# Patient Record
Sex: MALE | URBAN - METROPOLITAN AREA
[De-identification: names, ages, dates, MRNs, and addresses within clinical notes are randomized per-mention and may not be internally consistent; named-entity substitution may affect disease eponyms.]

---

## 2023-02-15 ENCOUNTER — APPOINTMENT (OUTPATIENT)
Dept: URBAN - METROPOLITAN AREA CLINIC 232 | Age: 63
Setting detail: DERMATOLOGY
End: 2023-02-15

## 2023-02-15 DIAGNOSIS — B35.1 TINEA UNGUIUM: ICD-10-CM

## 2023-02-15 PROCEDURE — OTHER MEDICATION COUNSELING: OTHER

## 2023-02-15 PROCEDURE — OTHER PHOTO-DOCUMENTATION: OTHER

## 2023-02-15 PROCEDURE — OTHER ADDITIONAL NOTES: OTHER

## 2023-02-15 PROCEDURE — OTHER PRESCRIPTION: OTHER

## 2023-02-15 PROCEDURE — OTHER RECOMMENDATIONS: OTHER

## 2023-02-15 PROCEDURE — OTHER COUNSELING: OTHER

## 2023-02-15 PROCEDURE — 99203 OFFICE O/P NEW LOW 30 MIN: CPT

## 2023-02-15 PROCEDURE — OTHER TREATMENT REGIMEN: OTHER

## 2023-02-15 RX ORDER — FLUCONAZOLE 150 MG/1
TABLET ORAL
Qty: 42 | Refills: 0 | COMMUNITY
Start: 2023-02-15

## 2023-02-15 RX ORDER — KETOCONAZOLE 20 MG/G
CREAM TOPICAL
Qty: 60 | Refills: 3 | COMMUNITY
Start: 2023-02-15

## 2023-02-15 ASSESSMENT — LOCATION DETAILED DESCRIPTION DERM: LOCATION DETAILED: RIGHT INDEX FINGERNAIL

## 2023-02-15 ASSESSMENT — LOCATION SIMPLE DESCRIPTION DERM: LOCATION SIMPLE: RIGHT INDEX FINGER

## 2023-02-15 ASSESSMENT — LOCATION ZONE DERM: LOCATION ZONE: FINGERNAIL

## 2023-02-15 NOTE — PROCEDURE: MEDICATION COUNSELING
6 Siliq Counseling:  I discussed with the patient the risks of Siliq including but not limited to new or worsening depression, suicidal thoughts and behavior, immunosuppression, malignancy, posterior leukoencephalopathy syndrome, and serious infections.  The patient understands that monitoring is required including a PPD at baseline and must alert us or the primary physician if symptoms of infection or other concerning signs are noted. There is also a special program designed to monitor depression which is required with Siliq.

## 2023-02-15 NOTE — PROCEDURE: ADDITIONAL NOTES
Render Risk Assessment In Note?: no
Additional Notes: Suggest patient sees Podiatrist to thin out the affected nail.
Detail Level: Simple

## 2023-02-15 NOTE — PROCEDURE: RECOMMENDATIONS
Render Risk Assessment In Note?: no
Detail Level: Zone
Recommendations (Free Text): Recommend pt uses Vicks VaporRub with occlusion nightly.\\nPigment is not appreciated in the affected nail, so concern for melanoma is low.  Should the nail show signs of pigment, a nail biopsy can be done (recommend Dr. Ford).  He mentioned liver issues, however he does take fluconazole so I just adjusted the dosing schedule to \"pulse\" dosing.
Recommendation Preamble: The following recommendations were made during the visit:

## 2023-02-15 NOTE — PROCEDURE: TREATMENT REGIMEN
Detail Level: Zone
Initiate Treatment: fluconazole 150 mg tablet \\nTake one tablet by mouth once daily at the start of each month for the first 7 days for the next 6 months.\\n\\nketoconazole 2 % topical cream \\nApply to affected areas on the hand up to twice daily.

## 2023-12-28 NOTE — PROCEDURE: MEDICATION COUNSELING
Requested medication(s) are due for refill today: Yes  Patient has already received a courtesy refill: No  Other reason request has been forwarded to provider: This refill cannot be delegated   Sotyktu Pregnancy And Lactation Text: There is insufficient data to evaluate whether or not Sotyktu is safe to use during pregnancy.   It is not known if Sotyktu passes into breast milk and whether or not it is safe to use when breastfeeding.

## 2025-01-31 NOTE — PROCEDURE: MEDICATION COUNSELING
01/31/25 0900   Team Meeting   Meeting Type Daily Rounds   Team Members Present   Team Members Present Physician;Nurse;;Occupational Therapist;Other (Discipline and Name)   Physician Team Member Emigdio   Nursing Team Member Andria   Social Work Team Member Edmar   OT Team Member Kirill   Other (Discipline and Name) Mario Arreola   Patient/Family Present   Patient Present No   Patient's Family Present No   Pt is med/meal compliant and visible on the milieu. Pt participates in groups and engages with staff and peers. Pt reports scales of a 2 for depression and a 0 for anxiety. Pt denies all SI/SIB/AVH/HI at this time. Pt's projected discharge date is scheduled for 02/05/25.    Drysol Counseling:  I discussed with the patient the risks of drysol/aluminum chloride including but not limited to skin rash, itching, irritation, burning.